# Patient Record
Sex: MALE | Race: WHITE | NOT HISPANIC OR LATINO | URBAN - METROPOLITAN AREA
[De-identification: names, ages, dates, MRNs, and addresses within clinical notes are randomized per-mention and may not be internally consistent; named-entity substitution may affect disease eponyms.]

---

## 2022-04-22 ENCOUNTER — HOSPITAL ENCOUNTER (EMERGENCY)
Facility: HOSPITAL | Age: 27
Discharge: HOME | End: 2022-04-22
Attending: EMERGENCY MEDICINE | Admitting: EMERGENCY MEDICINE

## 2022-04-22 VITALS
TEMPERATURE: 99.9 F | HEART RATE: 70 BPM | RESPIRATION RATE: 18 BRPM | SYSTOLIC BLOOD PRESSURE: 118 MMHG | OXYGEN SATURATION: 98 % | DIASTOLIC BLOOD PRESSURE: 82 MMHG

## 2022-04-22 DIAGNOSIS — K08.89 PAIN, DENTAL: Primary | ICD-10-CM

## 2022-04-22 PROCEDURE — 99283 EMERGENCY DEPT VISIT LOW MDM: CPT

## 2022-04-22 PROCEDURE — 63710000001 ONDANSETRON ODT 4 MG TABLET DISPERSIBLE: Performed by: NURSE PRACTITIONER

## 2022-04-22 RX ORDER — AMOXICILLIN 500 MG/1
500 CAPSULE ORAL 3 TIMES DAILY
Qty: 30 CAPSULE | Refills: 0 | Status: SHIPPED | OUTPATIENT
Start: 2022-04-22 | End: 2022-05-02

## 2022-04-22 RX ORDER — HYDROCODONE BITARTRATE AND ACETAMINOPHEN 7.5; 325 MG/1; MG/1
1 TABLET ORAL ONCE
Status: COMPLETED | OUTPATIENT
Start: 2022-04-22 | End: 2022-04-22

## 2022-04-22 RX ORDER — LIDOCAINE HYDROCHLORIDE 20 MG/ML
15 SOLUTION OROPHARYNGEAL ONCE
Status: COMPLETED | OUTPATIENT
Start: 2022-04-22 | End: 2022-04-22

## 2022-04-22 RX ORDER — ONDANSETRON 4 MG/1
4 TABLET, ORALLY DISINTEGRATING ORAL ONCE
Status: COMPLETED | OUTPATIENT
Start: 2022-04-22 | End: 2022-04-22

## 2022-04-22 RX ORDER — IBUPROFEN 600 MG/1
600 TABLET ORAL EVERY 6 HOURS PRN
Qty: 30 TABLET | Refills: 0 | Status: SHIPPED | OUTPATIENT
Start: 2022-04-22

## 2022-04-22 RX ORDER — ALUMINA, MAGNESIA, AND SIMETHICONE 2400; 2400; 240 MG/30ML; MG/30ML; MG/30ML
15 SUSPENSION ORAL ONCE
Status: COMPLETED | OUTPATIENT
Start: 2022-04-22 | End: 2022-04-22

## 2022-04-22 RX ORDER — AMOXICILLIN 250 MG/1
1000 CAPSULE ORAL ONCE
Status: COMPLETED | OUTPATIENT
Start: 2022-04-22 | End: 2022-04-22

## 2022-04-22 RX ORDER — IBUPROFEN 800 MG/1
800 TABLET ORAL ONCE
Status: COMPLETED | OUTPATIENT
Start: 2022-04-22 | End: 2022-04-22

## 2022-04-22 RX ADMIN — ONDANSETRON 4 MG: 4 TABLET, ORALLY DISINTEGRATING ORAL at 19:02

## 2022-04-22 RX ADMIN — ALUMINUM HYDROXIDE, MAGNESIUM HYDROXIDE, AND DIMETHICONE 15 ML: 400; 400; 40 SUSPENSION ORAL at 19:05

## 2022-04-22 RX ADMIN — AMOXICILLIN 1000 MG: 250 CAPSULE ORAL at 19:30

## 2022-04-22 RX ADMIN — HYDROCODONE BITARTRATE AND ACETAMINOPHEN 1 TABLET: 7.5; 325 TABLET ORAL at 19:02

## 2022-04-22 RX ADMIN — LIDOCAINE HYDROCHLORIDE 15 ML: 20 SOLUTION ORAL; TOPICAL at 19:05

## 2022-04-22 RX ADMIN — IBUPROFEN 800 MG: 800 TABLET, FILM COATED ORAL at 19:02

## 2022-04-22 NOTE — ED TRIAGE NOTES
Pt having dental pain to left bottom side started earlier this week, pt states has broken tooth. Pt tried to go to dental ER and it was closed.

## 2022-04-22 NOTE — ED PROVIDER NOTES
EMERGENCY DEPARTMENT ENCOUNTER    Room Number:  A02/02  Date seen:  4/22/2022  Time seen: 18:38 EDT  PCP: Provider, No Known  Historian: patient  HPI:  Chief complaint: left lower dental pain  A complete HPI/ROS/PMH/PSH/SH/FH are unobtainable due to: n/a  Context:Kashmir Hargrove is a 26 y.o. male with no significant past medical history other than wisdom tooth extraction who presents to the ED with c/o having lower left broken tooth for about a year.  He reports having his wisdom teeth removed prior to having this tooth fixed so that he could have further surgery, however, that was 1 year ago and due to financial reasons he was unable to follow up.      Today, he and family were driving back home to [place] and he developed severe dental pain in the area of the broken tooth.  Pain was not made better by tylenol.  It is not made worse by anything.  He has never had pain like this.  Denies trouble breathing, swallowing and has no facial swelling.    Patient was placed in face mask in first look. Patient was wearing facemask when I entered the room and throughout our encounter. I wore full protective equipment throughout this patient encounter including a N95 face mask, eye shield and gloves. Hand hygiene/washing of hands was performed before donning protective equipment and after removal when leaving the room.    MEDICAL RECORD REVIEW    ALLERGIES  Patient has no known allergies.    PAST MEDICAL HISTORY  Active Ambulatory Problems     Diagnosis Date Noted   • No Active Ambulatory Problems     Resolved Ambulatory Problems     Diagnosis Date Noted   • No Resolved Ambulatory Problems     No Additional Past Medical History       PAST SURGICAL HISTORY  No past surgical history on file.    FAMILY HISTORY  No family history on file.    SOCIAL HISTORY  Social History     Socioeconomic History   • Marital status:        REVIEW OF SYSTEMS  Review of Systems    All systems reviewed and negative except for those discussed in  HPI.     PHYSICAL EXAM    ED Triage Vitals   Temp Heart Rate Resp BP SpO2   04/22/22 1812 04/22/22 1811 04/22/22 1811 -- 04/22/22 1811   99.9 °F (37.7 °C) 109 18  98 %      Temp src Heart Rate Source Patient Position BP Location FiO2 (%)   04/22/22 1812 -- -- -- --   Tympanic         Physical Exam  HENT:      Mouth/Throat:      Lips: Pink.      Mouth: Mucous membranes are moist.      Dentition: Dental tenderness present. No dental caries or gum lesions.      Tongue: No lesions.      Pharynx: Oropharynx is clear. Uvula midline. No pharyngeal swelling, oropharyngeal exudate, posterior oropharyngeal erythema or uvula swelling.        Comments: In the area here there is a piece of bone extruding up through the gumline.  There is no fluctuance or tenderness at this area but it is abnormal.  This is just anterior to his fifth molar which was extracted.  There is no trismus        I have reviewed the triage vital signs and nursing notes.      GENERAL: not distressed  HENT: nares patent  EYES: no scleral icterus  NECK: no ROM limitations  CV: regular rhythm, regular rate, no murmur, no rubs, no gallups  RESPIRATORY: normal effort, CTAB  ABDOMEN: soft  : deferred  MUSCULOSKELETAL: no deformity  NEURO: alert, moves all extremities, follows commands  SKIN: warm, dry      PROGRESS, DATA ANALYSIS, CONSULTS AND MEDICAL DECISION MAKING  All labs have been independently reviewed by me.  All radiology studies have been reviewed by me and discussed with radiologist dictating the report.  EKG's independently viewed and interpreted by me unless stated otherwise. Discussion below represents my analysis of pertinent findings related to patient's condition, differential diagnosis, treatment plan and final disposition.     ED Course as of 04/22/22 1902 Fri Apr 22, 2022   1846 [facility] dispo: I discussed plan with patient to treat his pain with a one-time opiate pill here with some ibuprofen.  I will send him home with some dental balls and  give a dose of amoxicillin tonight.  Maintenance of pain control will be obtained by combining 500 mg Tylenol with 600 mg ibuprofen every 6 hours.  When he arrives home to [place] tomorrow he is to follow-up as soon as possible with dental provider.  There is no periapical abscess. [EW]      ED Course User Index  [EW] Carla Morel APRN     DDX: dental pain, broken tooth, dental abscess       Reviewed pt's history and workup with Dr. Piedra.     The patient's history, physical exam, and lab findings were discussed with the physician.  I discussed all results and noted any abnormalities with patient.  Discussed absoute need to recheck abnormalities with their family physician.  I answered any of the patient's questions.  Discussed plan for discharge, as there is no emergent indication for admission.  Pt is agreeable and understands need for follow up and repeat testing.  Pt is aware that discharge does not mean that nothing is wrong but it indicates no emergency is present and they must continue care with their family physician.  Pt is discharged with instructions to follow up with primary care doctor to have their blood pressure rechecked.       Disposition vitals:  Pulse 109   Temp 99.9 °F (37.7 °C) (Tympanic)   Resp 18   SpO2 98%       DIAGNOSIS  Final diagnoses:   Pain, dental       FOLLOW UP   Follow up with your Dental Provider in [place] or emergency dental facility               Carla Morel APRN  04/22/22 3604

## 2022-04-22 NOTE — DISCHARGE INSTRUCTIONS
Take one 500 mg Tylenol (generic is Acetaminophen) every 6 hours AT SAME TIME  mg Ibuprofen    Take antibiotics as prescribed    Return Precautions    Although you are being discharged from the ED today, I encourage you to return for worsening symptoms.  Things can, and do, change such that treatment at home with medication may not be adequate.      Specifically, return for any of the following:    Chest pain, shortness of breath, pain or nausea and vomiting not controlled by medications provided.    Please make a follow up with your Primary Care Provider for a blood pressure recheck.     Some options for acute dental care are as follows:    [facility] [place]  [phone]    [facility] [address]  [phone], there are also several other locations

## 2022-04-22 NOTE — ED NOTES
Pt reports broken tooth for 1 year that has had worsening pain over the last month. Pt denies any recent fever/chills. Pt reports he is from [place] and has a dentist and oral surgeon that he has used in [place] in the past.